# Patient Record
Sex: FEMALE | Race: WHITE
[De-identification: names, ages, dates, MRNs, and addresses within clinical notes are randomized per-mention and may not be internally consistent; named-entity substitution may affect disease eponyms.]

---

## 2018-06-17 ENCOUNTER — HOSPITAL ENCOUNTER (EMERGENCY)
Dept: HOSPITAL 17 - NEPE | Age: 23
Discharge: HOME | End: 2018-06-17
Payer: SELF-PAY

## 2018-06-17 VITALS
HEART RATE: 106 BPM | SYSTOLIC BLOOD PRESSURE: 127 MMHG | TEMPERATURE: 98.1 F | DIASTOLIC BLOOD PRESSURE: 82 MMHG | RESPIRATION RATE: 18 BRPM | OXYGEN SATURATION: 98 %

## 2018-06-17 DIAGNOSIS — F15.929: ICD-10-CM

## 2018-06-17 DIAGNOSIS — F14.929: ICD-10-CM

## 2018-06-17 DIAGNOSIS — F10.229: ICD-10-CM

## 2018-06-17 DIAGNOSIS — R56.9: Primary | ICD-10-CM

## 2018-06-17 PROCEDURE — 99281 EMR DPT VST MAYX REQ PHY/QHP: CPT

## 2018-06-17 NOTE — PD
HPI


Chief Complaint:  Seizure


Time Seen by Provider:  04:24


Travel History


International Travel<30 days:  No


Contact w/Intl Traveler<30days:  No


Traveled to known affect area:  No





History of Present Illness


HPI


Patient is a 22-year-old female presents emergency department with a friend for 

evaluation of seizure-like activity 3 times tonight.  Person is with her states 

that she has been doing cocaine and amphetamines and drinking tonight and had 3 

episodes where her body was shaking very quickly, does not describe postictal 

phase.  States his epidural wants time in the past.  The patient admits that 

she knows she drinks too much and now she should do drugs but continues to do 

so anyways.  Patient's friend states that she had to back the patient to come 

to the emergency department to be seen, this patient is currently intoxicated 

and of the influence of substances.  Otherwise she is cooperative with exam and 

states she does not want to be here.  Symptoms mild, resolved, associated signs 

symptoms in context as above.





PFSH


Past Medical History


Diminished Hearing:  No


Immunizations Current:  Yes


Seizures:  Yes


Pregnant?:  Unknown





Past Surgical History


Surgical History:  No Previous Surgery





Social History


Alcohol Use:  Yes


Tobacco Use:  Yes


Substance Use:  Yes (COKE)





Allergies-Medications


(Allergen,Severity, Reaction):  


Coded Allergies:  


     No Known Allergies (Unverified , 6/17/18)





Review of Systems


Except as stated in HPI:  all other systems reviewed are Neg





Physical Exam


Narrative


GENERAL: Well-developed well-nourished no obvious distress


SKIN: Focused skin assessment warm/dry.


HEAD: Atraumatic. Normocephalic.  No regan signs no raccoons eyes


EYES: Pupils equal and round. No scleral icterus. No injection or drainage. 


ENT: No nasal bleeding or discharge.  Mucous membranes pink and moist.


NECK: Trachea midline. No JVD.  Midline CT or L-spine tenderness peer


CARDIOVASCULAR: Regular rate and rhythm.  No murmur appreciated.


RESPIRATORY: No accessory muscle use. Clear to auscultation. Breath sounds 

equal bilaterally. 


GASTROINTESTINAL: Abdomen soft, non-tender, nondistended. Hepatic and splenic 

margins not palpable. 


MUSCULOSKELETAL: No obvious deformities. No clubbing.  No cyanosis.  No edema. 


NEUROLOGICAL: Awake and alert.  Cranial nerves II through XII grossly intact 

and nonfocal, 5 out of 5 strength in all 4 extremity's, cerebellar testing 

negative, no seizure activity seen in the emergency department.


PSYCHIATRIC: Appropriate mood and affect; insight and judgment normal.





Data


Data


Last Documented VS





Vital Signs








  Date Time  Temp Pulse Resp B/P (MAP) Pulse Ox O2 Delivery O2 Flow Rate FiO2


 


6/17/18 05:12        


 


6/17/18 03:52 98.1 106 18  98 Room Air  








Orders





 Orders


Ed Discharge Order (6/17/18 04:24)








MDM


Medical Decision Making


Medical Screen Exam Complete:  Yes


Emergency Medical Condition:  Yes


Differential Diagnosis


Seizure-like activity, epileptiform activity, alcohol intoxication, substance 

intoxication peer


Narrative Course


Patient room to the emergency department, she appears well albeit under the 

influence of substances, at this time is no indication of workup for this 

patient, she is neurologically nonfocal and needs to follow-up with [facility], [place], operated by [facility] or another substance abuse counseling center for cessation of her 

substance abuse.  She verbalized understanding and agreement.  Discussed return 

to ED criteria follow-up with a primary care physician.  Stable for discharge





Diagnosis





 Primary Impression:  


 Seizure-like activity


 Additional Impressions:  


 Substance abuse


 Alcoholism


Disposition:  01 DISCHARGE HOME


Condition:  Stable











Denilson Prather MD Jun 17, 2018 04:25